# Patient Record
(demographics unavailable — no encounter records)

---

## 2025-02-21 NOTE — ASSESSMENT
[FreeTextEntry1] : 66 year old male, former smoker (40 pack years, quit 2016), w/ PMHx of asthma, COPD, BPH, HLD, MI on Plavix, HTN, IDDM, GERD, obesity. Presents today for 12 mo. RTC CT chest results.  He underwent bronchoscopy on 2/2/21 with Dr Luis Zhu at [place], +squamous cell carcinoma.  Now 2yrs s/p FB, Rt VATS, RA, RLLobectomy, MLND on 3/26/2021 at [facility]. Pathology revealing pT1c, pN0 squamous cell carcinoma, 2.3cm, 0/9 lymph nodes involved.   CT chest on 2/14/25 at [facility]: - Interval resolution of RML bronchiolitis since 2/8/24 - New clustered nodularity in the posterior right upper lobe, also likely an area of bronchiolitis and mucoid impaction (Series 5, Image 197 & Series 5, Image 200) - Stable post op changes; Status post RLLobectomy  - Unchanged pleural effusion and pleural thickening, likely postsurgical  - Stable scattered solid pulmonary nodules measuring up to 4 mm  Pt presents today for follow up.   I have independently reviewed the medical records and imaging at the time of this office consultation, and discussed the following interpretations with the patient: - New clustered nodularity in the posterior right upper lobe. Other findings are stable. Recommend returning to clinic in 6 months with repeat non contrast CT Chest to re-evaluate findings. Patient is agreeable.   Recommendations reviewed with patient during this office visit, and all questions answered; Patient instructed on the importance of follow up and verbalizes understanding.  I, SYBIL Kimbrough, personally performed the evaluation and management (E/M) services for this established patient. That E/M includes conducting the examination, assessing all new/exacerbated conditions, and establishing a new plan of care. Today, My ACP, Ann Mattson, was here to observe my evaluation and management services for this patient to be followed going forward.

## 2025-02-21 NOTE — PHYSICAL EXAM
[] : no respiratory distress [Respiration, Rhythm And Depth] : normal respiratory rhythm and effort [Exaggerated Use Of Accessory Muscles For Inspiration] : no accessory muscle use [Auscultation Breath Sounds / Voice Sounds] : lungs were clear to auscultation bilaterally [Heart Rate And Rhythm] : heart rate was normal and rhythm regular [Examination Of The Chest] : the chest was normal in appearance [Chest Visual Inspection Thoracic Asymmetry] : no chest asymmetry [Diminished Respiratory Excursion] : normal chest expansion [Bowel Sounds] : normal bowel sounds [Involuntary Movements] : no involuntary movements were seen [Skin Color & Pigmentation] : normal skin color and pigmentation [Oriented To Time, Place, And Person] : oriented to person, place, and time

## 2025-02-21 NOTE — ASSESSMENT
[FreeTextEntry1] : 66 year old male, former smoker (40 pack years, quit 2016), w/ PMHx of asthma, COPD, BPH, HLD, MI on Plavix, HTN, IDDM, GERD, obesity. Presents today for 12 mo. RTC CT chest results.  He underwent bronchoscopy on 2/2/21 with Dr Luis Zhu at [place], +squamous cell carcinoma.  Now 2yrs s/p FB, Rt VATS, RA, RLLobectomy, MLND on 3/26/2021 at [facility]. Pathology revealing pT1c, pN0 squamous cell carcinoma, 2.3cm, 0/9 lymph nodes involved.   CT chest on 2/14/25 at [facility]: - Interval resolution of RML bronchiolitis since 2/8/24 - New clustered nodularity in the posterior right upper lobe, also likely an area of bronchiolitis and mucoid impaction (Series 5, Image 197 & Series 5, Image 200) - Stable post op changes; Status post RLLobectomy  - Unchanged pleural effusion and pleural thickening, likely postsurgical  - Stable scattered solid pulmonary nodules measuring up to 4 mm  Pt presents today for follow up.   I have independently reviewed the medical records and imaging at the time of this office consultation, and discussed the following interpretations with the patient: - New clustered nodularity in the posterior right upper lobe. Other findings are stable. Recommend returning to clinic in 6 months with repeat non contrast CT Chest to re-evaluate findings. Patient is agreeable.   Recommendations reviewed with patient during this office visit, and all questions answered; Patient instructed on the importance of follow up and verbalizes understanding.  I, SYBIL Kimbrough, personally performed the evaluation and management (E/M) services for this established patient. That E/M includes conducting the examination, assessing all new/exacerbated conditions, and establishing a new plan of care. Today, My ACP, Ann Mattson, was here to observe my evaluation and management services for this patient to be followed going forward.

## 2025-02-21 NOTE — HISTORY OF PRESENT ILLNESS
[FreeTextEntry1] : 66 year old male, former smoker (40 pack years, quit 2016), w/ PMHx of asthma, COPD, BPH, HLD, MI on Plavix, HTN, IDDM, GERD, obesity. Presents today for 12 mo. RTC CT chest results.  He underwent bronchoscopy on 2/2/21 with Dr Luis Zhu at [place], +squamous cell carcinoma.  Now 2yrs s/p FB, Rt VATS, RA, RLLobectomy, MLND on 3/26/2021 at [facility]. Pathology revealing pT1c, pN0 squamous cell carcinoma, 2.3cm, 0/9 lymph nodes involved.   CT chest 03/08/2022 - stable right lower lobectomy postsurgical changes. Decreased trace  right pleural effusion.  - new tubular small nodule in the left upper lobe is suggestive of airway impaction. - left apical nodular density and left upper lobe nodules stable.    CT Chest on 9/2022 at [facility]: - stable post-op changes - decreased trace Rt pleural effusion - stable small solid lung nodules including previously new GUZMAN nodule which may represent airway impaction  CT Chest on 4/29/23 at [facility]: - post-op changes - new clustered densities in the RUL, majority are subsolid, most likely inflammatory  CT chest on 2/14/25 at [facility]: - Interval resolution of RML bronchiolitis since 2/8/24 - New clustered nodularity in the posterior right upper lobe, also likely an area of bronchiolitis and mucoid impaction (Series 5, Image 197 & Series 5, Image 200) - Stable post op changes; Status post RLLobectomy  - Unchanged pleural effusion and pleural thickening, likely postsurgical  - Stable scattered solid pulmonary nodules measuring up to 4 mm  Pt presents today for follow up. Today, patient denies worsening SOB, chest pain, cough, hemoptysis, fever, chills, night sweats, lightheadedness or dizziness.

## 2025-02-21 NOTE — CONSULT LETTER
[Dear  ___] : Dear  [unfilled], [Consult Letter:] : I had the pleasure of evaluating your patient, [unfilled]. [Please see my note below.] : Please see my note below. [Consult Closing:] : Thank you very much for allowing me to participate in the care of this patient.  If you have any questions, please do not hesitate to contact me. [Sincerely,] : Sincerely, [FreeTextEntry2] : REF/PULM: DR MARINO GUERRA\par  PCP: DR. RADHA CARABALLO\par   [FreeTextEntry3] : Juanjose Gordon MD\par  Professor, Cardiovascular & Thoracic Surgery\par  [employer]\par  Director of the [facility] \par  Director of Thoracic Surgery, [facility]\par  \par  [facility]\par  [address]\par  [facility] 4th Floor\par  [place] [address]\par  Phone: [phone]\par  Fax: [phone]\par  \par  \par

## 2025-02-21 NOTE — HISTORY OF PRESENT ILLNESS
[FreeTextEntry1] : 66 year old male, former smoker (40 pack years, quit 2016), w/ PMHx of asthma, COPD, BPH, HLD, MI on Plavix, HTN, IDDM, GERD, obesity. Presents today for 12 mo. RTC CT chest results.  He underwent bronchoscopy on 2/2/21 with Dr Luis Zhu at [place], +squamous cell carcinoma.  Now 2yrs s/p FB, Rt VATS, RA, RLLobectomy, MLND on 3/26/2021 at [facility]. Pathology revealing pT1c, pN0 squamous cell carcinoma, 2.3cm, 0/9 lymph nodes involved.   CT chest 03/08/2022 - stable right lower lobectomy postsurgical changes. Decreased trace  right pleural effusion.  - new tubular small nodule in the left upper lobe is suggestive of airway impaction. - left apical nodular density and left upper lobe nodules stable.    CT Chest on 9/2022 at [facility]: - stable post-op changes - decreased trace Rt pleural effusion - stable small solid lung nodules including previously new GUZMAN nodule which may represent airway impaction  CT Chest on 4/29/23 at [facility]: - post-op changes - new clustered densities in the RUL, majority are subsolid, most likely inflammatory  CT chest on 2/14/25 at [facility]: - Interval resolution of RML bronchiolitis since 2/8/24 - New clustered nodularity in the posterior right upper lobe, also likely an area of bronchiolitis and mucoid impaction (Series 5, Image 197 & Series 5, Image 200) - Stable post op changes; Status post RLLobectomy  - Unchanged pleural effusion and pleural thickening, likely postsurgical  - Stable scattered solid pulmonary nodules measuring up to 4 mm  Pt presents today for follow up. Today, patient denies worsening SOB, chest pain, cough, hemoptysis, fever, chills, night sweats, lightheadedness or dizziness.

## 2025-02-21 NOTE — HISTORY OF PRESENT ILLNESS
[FreeTextEntry1] : 66 year old male, former smoker (40 pack years, quit 2016), w/ PMHx of asthma, COPD, BPH, HLD, MI on Plavix, HTN, IDDM, GERD, obesity. Presents today for 12 mo. RTC CT chest results.  He underwent bronchoscopy on 2/2/21 with Dr Luis Zhu at [place], +squamous cell carcinoma.  Now 2yrs s/p FB, Rt VATS, RA, RLLobectomy, MLND on 3/26/2021 at [facility]. Pathology revealing pT1c, pN0 squamous cell carcinoma, 2.3cm, 0/9 lymph nodes involved.   CT chest 03/08/2022 - stable right lower lobectomy postsurgical changes. Decreased trace  right pleural effusion.  - new tubular small nodule in the left upper lobe is suggestive of airway impaction. - left apical nodular density and left upper lobe nodules stable.    CT Chest on 9/2022 at [facility]: - stable post-op changes - decreased trace Rt pleural effusion - stable small solid lung nodules including previously new GUZMAN nodule which may represent airway impaction  CT Chest on 4/29/23 at [facility]: - post-op changes - new clustered densities in the RUL, majority are subsolid, most likely inflammatory  CT chest on 2/14/25 at [facility]: - Interval resolution of RML bronchiolitis since 2/8/24 - New clustered nodularity in the posterior right upper lobe, also likely an area of bronchiolitis and mucoid impaction (Series 5, Image 197 & Series 5, Image 200) - Stable post op changes; Status post RLLobectomy  - Unchanged pleural effusion and pleural thickening, likely postsurgical  - Stable scattered solid pulmonary nodules measuring up to 4 mm  Pt presents today for follow up. Today, patient denies worsening SOB, chest pain, cough, hemoptysis, fever, chills, night sweats, lightheadedness or dizziness.

## 2025-02-21 NOTE — ASSESSMENT
[FreeTextEntry1] : 66 year old male, former smoker (40 pack years, quit 2016), w/ PMHx of asthma, COPD, BPH, HLD, MI on Plavix, HTN, IDDM, GERD, obesity. Presents today for 12 mo. RTC CT chest results.  He underwent bronchoscopy on 2/2/21 with Dr Luis Zhu at [place], +squamous cell carcinoma.  Now 2yrs s/p FB, Rt VATS, RA, RLLobectomy, MLND on 3/26/2021 at [facility]. Pathology revealing pT1c, pN0 squamous cell carcinoma, 2.3cm, 0/9 lymph nodes involved.   CT chest on 2/14/25 at [facility]: - Interval resolution of RML bronchiolitis since 2/8/24 - New clustered nodularity in the posterior right upper lobe, also likely an area of bronchiolitis and mucoid impaction (Series 5, Image 197 & Series 5, Image 200) - Stable post op changes; Status post RLLobectomy  - Unchanged pleural effusion and pleural thickening, likely postsurgical  - Stable scattered solid pulmonary nodules measuring up to 4 mm  Pt presents today for follow up.   I have independently reviewed the medical records and imaging at the time of this office consultation, and discussed the following interpretations with the patient: - New clustered nodularity in the posterior right upper lobe. Other findings are stable. Recommend returning to clinic in 6 months with repeat non contrast CT Chest to re-evaluate findings. Patient is agreeable.   Recommendations reviewed with patient during this office visit, and all questions answered; Patient instructed on the importance of follow up and verbalizes understanding.  I, SYBIL Kimbrough, personally performed the evaluation and management (E/M) services for this established patient. That E/M includes conducting the examination, assessing all new/exacerbated conditions, and establishing a new plan of care. Today, My ACP, Ann Mattson, was here to observe my evaluation and management services for this patient to be followed going forward.

## 2025-02-21 NOTE — CONSULT LETTER
[Dear  ___] : Dear  [unfilled], [Consult Letter:] : I had the pleasure of evaluating your patient, [unfilled]. [Please see my note below.] : Please see my note below. [Consult Closing:] : Thank you very much for allowing me to participate in the care of this patient.  If you have any questions, please do not hesitate to contact me. [Sincerely,] : Sincerely, [FreeTextEntry2] : REF/PULM: DR MARINO GUERRA\par  PCP: DR. RADHA CARABALLO\par   [FreeTextEntry3] : Juanjose Gordon MD\par  Professor, Cardiovascular & Thoracic Surgery\par  [employer]\par  Director of the [facility] \par  Director of Thoracic Surgery, [facility]\par  \par  [facility]\par  [address]\par  [facility] 4th Floor\par  [place] [address]\par  Phone: [phone]\par  Fax: [phone]\par  \par  \par

## 2025-02-21 NOTE — CONSULT LETTER
[Dear  ___] : Dear  [unfilled], [Consult Letter:] : I had the pleasure of evaluating your patient, [unfilled]. [Please see my note below.] : Please see my note below. [Consult Closing:] : Thank you very much for allowing me to participate in the care of this patient.  If you have any questions, please do not hesitate to contact me. [Sincerely,] : Sincerely, [FreeTextEntry2] : REF/PULM: DR MARINO GUERRA\par  PCP: DR. RADHA CARABALLO\par   [FreeTextEntry3] : Juanjose Gordon MD\par  Professor, Cardiovascular & Thoracic Surgery\par  [employer] [facility] [employer]\par  Director of the [facility] \par  Director of Thoracic Surgery, [facility]\par  \par  [facility]\par  [address]\par  [facility] 4th Floor\par  [place] [address]\par  Phone: [phone]\par  Fax: [phone]\par  \par  \par

## 2025-02-21 NOTE — DATA REVIEWED
Healing well. [FreeTextEntry1] : I have independently reviewed the following:  CT chest done on 2/14/24: ([facility])

## 2025-02-21 NOTE — DATA REVIEWED
[FreeTextEntry1] : I have independently reviewed the following:  CT chest done on 2/14/24: ([facility])

## 2025-02-21 NOTE — DATA REVIEWED
[FreeTextEntry1] : I have independently reviewed the following:  CT chest done on 2/14/24: ([facility])